# Patient Record
Sex: FEMALE | Race: BLACK OR AFRICAN AMERICAN | Employment: FULL TIME | URBAN - METROPOLITAN AREA
[De-identification: names, ages, dates, MRNs, and addresses within clinical notes are randomized per-mention and may not be internally consistent; named-entity substitution may affect disease eponyms.]

---

## 2021-04-13 PROCEDURE — 99282 EMERGENCY DEPT VISIT SF MDM: CPT

## 2021-04-14 ENCOUNTER — HOSPITAL ENCOUNTER (EMERGENCY)
Facility: CLINIC | Age: 36
Discharge: HOME | End: 2021-04-14
Attending: EMERGENCY MEDICINE | Admitting: EMERGENCY MEDICINE
Payer: COMMERCIAL

## 2021-04-14 VITALS
TEMPERATURE: 97.5 F | OXYGEN SATURATION: 100 % | HEART RATE: 65 BPM | SYSTOLIC BLOOD PRESSURE: 100 MMHG | DIASTOLIC BLOOD PRESSURE: 65 MMHG | RESPIRATION RATE: 16 BRPM

## 2021-04-14 DIAGNOSIS — R51.9 NONINTRACTABLE HEADACHE, UNSPECIFIED CHRONICITY PATTERN, UNSPECIFIED HEADACHE TYPE: ICD-10-CM

## 2021-04-14 RX ORDER — KETOROLAC TROMETHAMINE 15 MG/ML
15 INJECTION, SOLUTION INTRAMUSCULAR; INTRAVENOUS ONCE
Status: DISCONTINUED | OUTPATIENT
Start: 2021-04-14 | End: 2021-04-14 | Stop reason: HOSPADM

## 2021-04-14 ASSESSMENT — ENCOUNTER SYMPTOMS
WEAKNESS: 0
NUMBNESS: 0
SHORTNESS OF BREATH: 0
MYALGIAS: 1
FEVER: 0
HEADACHES: 1
VOMITING: 0

## 2021-04-14 NOTE — LETTER
April 14, 2021      To Whom It May Concern:      Pili Dejesus was seen in our Emergency Department today, 04/14/21.  I expect her condition to improve over the next day.  She may return to work/school when improved.    Sincerely,        Evert Elliott RN

## 2021-04-14 NOTE — ED PROVIDER NOTES
History     Chief Complaint:  Headache     HPI   Pili Dejesus is a 36 year old female who presents for evaluation of headache. The patient reports that she had her COVID-19 vaccination 2 days ago (4/12/2021) and yesterday afternoon started to develop a headache with body aches. She describes that her arm is sore from the vaccination site and while she was trying to work on the computer, her vision felt cloudy. She states that her headache is now feeling improved and she no longer has any vision changes. The patient denies fever, chest pain, shortness of breath, numbness, tingling, weakness, vomiting, syncope, or recent falls. The patient does not take hormone supplementation and has no history of headaches. She has not taken anything at home for her symptoms.     Review of Systems   Constitutional: Negative for fever.   Eyes: Positive for visual disturbance (cloudy).   Respiratory: Negative for shortness of breath.    Cardiovascular: Negative for chest pain.   Gastrointestinal: Negative for vomiting.   Musculoskeletal: Positive for myalgias.   Neurological: Positive for headaches. Negative for syncope, weakness and numbness.   All other systems reviewed and are negative.      Allergies:  No Known Allergies    Medications:  No current medications.     Past Medical History:     Patient denies past medical history.    Social History:  The patient was unaccompanied to the ED.      Physical Exam     Patient Vitals for the past 24 hrs:   BP Temp Temp src Pulse Resp SpO2   04/14/21 0253 -- -- -- 65 16 100 %   04/14/21 0146 -- 97.5  F (36.4  C) Oral -- -- --   04/14/21 0012 100/65 97.8  F (36.6  C) Oral 61 16 100 %   04/13/21 2038 102/67 97.7  F (36.5  C) Temporal 60 16 100 %     Physical Exam    Gen: alert  HEENT: PERRL. No papilledema. oropharynx clear  Neck: normal ROM  CV: RRR, no murmurs  Pulm: breath sounds equal, lungs clear  Abd: Soft, nontender  Back: no evidence of injury, no cva tenderness  MSK: no deformity,  moves all extremities  Skin: no rash  Neuro: alert, oriented x3, PERRL, EOMI, CN 2-7 and 9-12 intact, 5/5 grasp BUE, 5/5 elbow flexion and extension BUE, 5/5 shoulder abduction BUE, 5/5 hip flexion, knee flexion, knee extension, plantar and dorsiflexion BLE, no pronator drift, normal gait, negative romberg, no dysdiadochokinesia, normal finger-nose-finger testing     Emergency Department Course     Emergency Department Course:    Reviewed:    I reviewed the patient's nursing notes, vitals, past medical records, Care Everywhere.     Assessments:    0215 I performed an exam of the patient as documented above.     Disposition:  The patient was discharged to home.     Impression & Plan          Medical Decision Making:  Pili Dejesus is a 36 year old female who presents to the emergency department today for evaluation of headache after COVID vaccine.  Here neurologic exam was normal.  Headache has improved.  HA is a known side effect of COVID vaccine.  Discussed cases of venous sinus thrombosis in the recent news- very rare.  Given normal neuro exam and improving headache without intervention doubt this but discussed to return if any new severe headache, vision change or other neuro cahnges.    Diagnosis:    ICD-10-CM    1. Nonintractable headache, unspecified chronicity pattern, unspecified headache type  R51.9      Scribe Disclosure:  I, Orla Severson, am serving as a scribe at 1:21 AM on 4/14/2021 to document services personally performed by Caron Jenkins MD based on my observations and the provider's statements to me.     [facility] EMERGENCY DEPT         Caron Jenkins MD  04/14/21 5021

## 2021-04-14 NOTE — ED NOTES
Pt reports muscle aches and fever. Pt afebrile when oral temp obtained. Pt reports muscle aches since she had her covid vaccine yesterday. A&Ox4 ABCD's intact

## 2021-04-14 NOTE — ED NOTES
RN in to administer Toradol. Pt declines. Pt states she is worried about it reacting with her COVID vaccine.

## 2021-04-14 NOTE — ED TRIAGE NOTES
Patient received first dose of COVID vaccine yesterday morning.      At 2100 yesterday began having dizziness, blurred vision and chills.      States symptoms have continued to worsen since then.      ABCs intact.  Alert and oriented x 4.

## 2021-04-16 ENCOUNTER — OFFICE VISIT (OUTPATIENT)
Dept: INTERNAL MEDICINE | Facility: CLINIC | Age: 36
End: 2021-04-16
Payer: COMMERCIAL

## 2021-04-16 VITALS
TEMPERATURE: 98 F | BODY MASS INDEX: 19.5 KG/M2 | WEIGHT: 114.25 LBS | OXYGEN SATURATION: 99 % | HEART RATE: 83 BPM | SYSTOLIC BLOOD PRESSURE: 108 MMHG | HEIGHT: 64 IN | DIASTOLIC BLOOD PRESSURE: 67 MMHG

## 2021-04-16 DIAGNOSIS — R51.9 ACUTE NONINTRACTABLE HEADACHE, UNSPECIFIED HEADACHE TYPE: Primary | ICD-10-CM

## 2021-04-16 PROCEDURE — 99202 OFFICE O/P NEW SF 15 MIN: CPT | Performed by: INTERNAL MEDICINE

## 2021-04-16 SDOH — HEALTH STABILITY: MENTAL HEALTH: HOW OFTEN DO YOU HAVE A DRINK CONTAINING ALCOHOL?: NEVER

## 2021-04-16 ASSESSMENT — MIFFLIN-ST. JEOR: SCORE: 1198.23

## 2021-04-16 NOTE — LETTER
[place] [address]  [facility] [address]  [phone]          April 16, 2021    RE:  Pili Dejesus                                                                                                                                                       [id number] [address]            To whom it may concern:    Pili Dejesus is under my professional care.    She can return to work without restrictions on 04/16/2021    Sincerely,        Vlad Rowley MD

## 2021-04-16 NOTE — PROGRESS NOTES
"    Assessment & Plan     Acute nonintractable headache, unspecified headache type  Resolved headache, myalgia, likely post COVID 19 vaccine side effect.   Normal exam today, OK to return to work              See Patient Instructions    Return for follow up on acute problem if persists.    Vlad Rowley MD  [facility] KADEN Alejandre is a 36 year old who presents for the following health issues     HPI     ED/UC Followup:    Facility:  [facility]  Date of visit: 04/14/21  Reason for visit: Headache  Current Status: improved        Patient is seen for a follow up visit.  Seen in ED 2 days ago for left arm pain, headache, blurred vision after receiving JJ COVID vaccine.   Symptoms have now resolved. Feels well.   No arm pain or swelling, no redness at the site of injection.   No recurrent HA, no fever, chills, SOB, CP.     Review of Systems   Constitutional, HEENT, cardiovascular, pulmonary, gi and gu systems are negative, except as otherwise noted.      Objective    /67 (BP Location: Right arm, Patient Position: Sitting, Cuff Size: Adult Small)   Pulse 83   Temp 98  F (36.7  C) (Oral)   Ht 1.626 m (5' 4\")   Wt 51.8 kg (114 lb 4 oz)   SpO2 99%   BMI 19.61 kg/m    Body mass index is 19.61 kg/m .  Physical Exam   GENERAL: healthy, alert and no distress  NECK: no adenopathy, no asymmetry, masses, or scars and thyroid normal to palpation  RESP: lungs clear to auscultation - no rales, rhonchi or wheezes  CV: regular rate and rhythm, normal S1 S2, no S3 or S4, no murmur, click or rub, no peripheral edema and peripheral pulses strong  ABDOMEN: soft, nontender, no hepatosplenomegaly, no masses and bowel sounds normal  MS: no gross musculoskeletal defects noted, no edema  NEURO: Normal strength and tone, mentation intact and speech normal                "